# Patient Record
Sex: MALE | Race: WHITE | Employment: UNEMPLOYED | URBAN - METROPOLITAN AREA
[De-identification: names, ages, dates, MRNs, and addresses within clinical notes are randomized per-mention and may not be internally consistent; named-entity substitution may affect disease eponyms.]

---
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
## 2025-07-15 ENCOUNTER — OFFICE VISIT (OUTPATIENT)
Age: 20
End: 2025-07-15

## 2025-07-15 VITALS
WEIGHT: 146 LBS | OXYGEN SATURATION: 97 % | RESPIRATION RATE: 18 BRPM | DIASTOLIC BLOOD PRESSURE: 72 MMHG | TEMPERATURE: 98.7 F | SYSTOLIC BLOOD PRESSURE: 127 MMHG | HEART RATE: 62 BPM | BODY MASS INDEX: 22.2 KG/M2

## 2025-07-15 DIAGNOSIS — L72.0 EPIDERMOID CYST OF SKIN: Primary | ICD-10-CM

## 2025-07-15 NOTE — PATIENT INSTRUCTIONS
Thank you for choosing [facility].    This appears to be a benign skin nodule such as a cyst.  However if it becomes bothersome I would recommend follow-up with a dermatologist to possibly remove.  If you notice any other nodules surfacing please return to the clinic.

## 2025-07-15 NOTE — PROGRESS NOTES
7/15/2025   Patient Status: New patient  Romero Cohen (: 2005) is a 19 y.o. male, New patient, here for evaluation of the following chief complaint(s):  Mass (Mass on the Left arm x 1 month.)          Assessment & Plan  Epidermoid cyst of skin  Alert and orient x 3  No acute distress    Reassured the patient that this skin cyst appears to be benign.  No red flag symptoms such as fixed, irregular borders, redness, signs of infection.  No other skin abnormalities noted.  Discussed that since it is not bothersome would recommend a wait and watch which the patient agreed with.  If becomes bothersome would recommend follow-up with dermatology.           Handout given with care instructions  OTC for symptom management. Increase fluid intake, ensure adequate nutritional intake.  Follow up with PCP as needed.  Go to ED with development of any acute symptoms.     SUBJECTIVE/OBJECTIVE:  Patient presents with mass to his left forearm.  He noticed it about 1 month ago but is unsure how long it has been there.  Has not changed in size or color since he first noticed it.  Denies pain or discomfort.  Denies any other nodules.      Mass       Physical Exam  Vitals and nursing note reviewed.   Constitutional:       General: He is not in acute distress.     Appearance: Normal appearance. He is not ill-appearing, toxic-appearing or diaphoretic.   Cardiovascular:      Rate and Rhythm: Normal rate.   Pulmonary:      Effort: Pulmonary effort is normal.   Skin:     General: Skin is warm and dry.      Capillary Refill: Capillary refill takes less than 2 seconds.      Comments: About 1-1/2 cm circular movable nodule without erythema or tenderness   Neurological:      Mental Status: He is alert.   Psychiatric:         Mood and Affect: Mood normal.         Behavior: Behavior normal.         Thought Content: Thought content normal.         Judgment: Judgment normal.          Vitals:    07/15/25 1630   BP: 127/72   BP Site: Left